# Patient Record
Sex: MALE | Race: WHITE | NOT HISPANIC OR LATINO | Employment: FULL TIME | URBAN - METROPOLITAN AREA
[De-identification: names, ages, dates, MRNs, and addresses within clinical notes are randomized per-mention and may not be internally consistent; named-entity substitution may affect disease eponyms.]

---

## 2017-07-07 ENCOUNTER — OFFICE VISIT (OUTPATIENT)
Dept: NEUROSURGERY | Facility: CLINIC | Age: 57
End: 2017-07-07

## 2017-07-07 VITALS
DIASTOLIC BLOOD PRESSURE: 74 MMHG | HEART RATE: 59 BPM | WEIGHT: 152.8 LBS | BODY MASS INDEX: 23.98 KG/M2 | HEIGHT: 67 IN | SYSTOLIC BLOOD PRESSURE: 116 MMHG

## 2017-07-07 DIAGNOSIS — M48.061 LUMBAR STENOSIS: Primary | ICD-10-CM

## 2017-07-07 DIAGNOSIS — M51.37 DEGENERATION OF LUMBAR OR LUMBOSACRAL INTERVERTEBRAL DISC: ICD-10-CM

## 2017-07-07 PROCEDURE — 99243 OFF/OP CNSLTJ NEW/EST LOW 30: CPT | Performed by: PHYSICIAN ASSISTANT

## 2017-07-07 NOTE — PROGRESS NOTES
Subjective   Patient ID: Demetrio Cotton is a 56 y.o. male is being seen for consultation today at the request of Kassidy Day MD  for back and left leg pain.  He denies any recent cause or injury.  Mr. Cotton has not had any treatments.  He is not taking any pain medications at this time.     Back Pain   This is a new problem. The current episode started more than 1 month ago (3-4 months ). The problem occurs intermittently. The problem has been gradually worsening since onset. The pain is present in the lumbar spine. The quality of the pain is described as aching and shooting. The pain radiates to the right thigh, left knee and left foot (left lower leg ). The pain is at a severity of 6/10. The pain is moderate. The symptoms are aggravated by position. Associated symptoms include leg pain. Pertinent negatives include no bladder incontinence, bowel incontinence, fever, numbness, pelvic pain, perianal numbness, tingling or weakness. (Tightness in  Left foot ) He has tried nothing for the symptoms.   Leg Pain    There was no injury mechanism. The pain is present in the left thigh, left hip, left leg and left foot. The quality of the pain is described as aching. The pain is at a severity of 5/10. The pain is moderate. Pertinent negatives include no numbness or tingling. Associated symptoms comments: Tightness in left foot . He has tried nothing for the symptoms.       The following portions of the patient's history were reviewed and updated as appropriate: allergies, current medications, past family history, past medical history, past social history, past surgical history and problem list.    Review of Systems   Constitutional: Negative for fever.   Gastrointestinal: Negative for bowel incontinence.   Genitourinary: Negative for bladder incontinence, difficulty urinating and pelvic pain.   Musculoskeletal: Positive for back pain (left leg pain ). Negative for gait problem.   Neurological: Negative for tingling,  weakness and numbness.   All other systems reviewed and are negative.      Objective   Physical Exam   Constitutional: He is oriented to person, place, and time. He appears well-developed and well-nourished.   HENT:   Head: Normocephalic and atraumatic.   Right Ear: External ear normal.   Left Ear: External ear normal.   Eyes: Conjunctivae and EOM are normal. Pupils are equal, round, and reactive to light. Right eye exhibits no discharge. Left eye exhibits no discharge.   Neck: Normal range of motion. Neck supple. No tracheal deviation present.   Cardiovascular: Normal rate and regular rhythm.    Pulmonary/Chest: Effort normal and breath sounds normal. No stridor. No respiratory distress.   Abdominal: Soft. Bowel sounds are normal.   Musculoskeletal: Normal range of motion. He exhibits no edema, tenderness or deformity.   Neurological: He is alert and oriented to person, place, and time. He has normal strength and normal reflexes. He displays no atrophy, no tremor and normal reflexes. No cranial nerve deficit or sensory deficit. He exhibits normal muscle tone. He displays a negative Romberg sign. He displays no seizure activity. Coordination and gait normal.   No long tract signs   Skin: Skin is warm and dry.   Psychiatric: He has a normal mood and affect. His behavior is normal. Judgment and thought content normal.   Nursing note and vitals reviewed.    Neurologic Exam     Mental Status   Oriented to person, place, and time.     Cranial Nerves     CN III, IV, VI   Pupils are equal, round, and reactive to light.  Extraocular motions are normal.     Motor Exam     Strength   Strength 5/5 throughout.       Assessment/Plan   Independent Review of Radiographic Studies:    I did review the lumbar spine MRI done without contrast on May 4, 2017 at [facility].  It shows multilevel disc bulging with mild degeneration with moderate stenosis at L3-L4 and more severe stenosis at L4-L5 secondary to a combination of facet  arthropathy disc bulging and ligamentum flavum thickening.  Medical Decision Making:    Mr. Hartmannvers for to us by Dr. Day for back and leg pain that began about 4-5 months ago.  The back pain is described as very mild and more of a soreness with prolonged activity and has been present for longer periods intermittently.  It is not limiting.  However the left anterolateral thigh pain has been more recent and more constant.  It is also fairly mild and more annoying than limiting.  It is described as burning.  It worsens with activity and improves minimally with rest.  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  He has not had any formal treatment at this point.  He denies weakness or incontinence.    Review the MRI with him.  Certainly at this point his pain is mild and he does not have any deficits on exam.  I do not think any surgery is indicated.  If his pain were to worsen epidural injections could be considered but at this point I think therapy is the most reasonable option to try initially.  He will call if the therapy does not help or his symptoms worsen.  Again epidural injections could be considered as a backup option and only if the radicular pain got much worse or he began to experience weakness we consider anything surgical.  Demetrio was seen today for back pain and leg pain.    Diagnoses and all orders for this visit:    Lumbar stenosis  -     Ambulatory Referral to Physical Therapy Evaluate and treat (2-3 times per week for 4wks, core strength, modalities, ROM)    Degeneration of lumbar or lumbosacral intervertebral disc  -     Ambulatory Referral to Physical Therapy Evaluate and treat (2-3 times per week for 4wks, core strength, modalities, ROM)      Return if symptoms worsen or fail to improve.

## 2020-12-17 VITALS
SYSTOLIC BLOOD PRESSURE: 112 MMHG | DIASTOLIC BLOOD PRESSURE: 55 MMHG | DIASTOLIC BLOOD PRESSURE: 81 MMHG | DIASTOLIC BLOOD PRESSURE: 84 MMHG | DIASTOLIC BLOOD PRESSURE: 59 MMHG | TEMPERATURE: 96.6 F | RESPIRATION RATE: 12 BRPM | WEIGHT: 143 LBS | HEART RATE: 68 BPM | HEIGHT: 67 IN | SYSTOLIC BLOOD PRESSURE: 96 MMHG | SYSTOLIC BLOOD PRESSURE: 100 MMHG | OXYGEN SATURATION: 96 % | SYSTOLIC BLOOD PRESSURE: 130 MMHG | DIASTOLIC BLOOD PRESSURE: 69 MMHG | OXYGEN SATURATION: 97 % | HEART RATE: 65 BPM | RESPIRATION RATE: 14 BRPM | SYSTOLIC BLOOD PRESSURE: 98 MMHG | HEART RATE: 61 BPM | SYSTOLIC BLOOD PRESSURE: 103 MMHG | SYSTOLIC BLOOD PRESSURE: 109 MMHG | HEART RATE: 62 BPM | HEART RATE: 66 BPM | RESPIRATION RATE: 9 BRPM | RESPIRATION RATE: 13 BRPM | DIASTOLIC BLOOD PRESSURE: 61 MMHG | OXYGEN SATURATION: 99 % | TEMPERATURE: 97.8 F | OXYGEN SATURATION: 98 % | SYSTOLIC BLOOD PRESSURE: 135 MMHG | HEART RATE: 57 BPM | RESPIRATION RATE: 10 BRPM | RESPIRATION RATE: 20 BRPM

## 2020-12-18 ENCOUNTER — AMBULATORY SURGICAL CENTER (AMBULATORY)
Dept: URBAN - METROPOLITAN AREA SURGERY 17 | Facility: SURGERY | Age: 60
End: 2020-12-18

## 2020-12-18 DIAGNOSIS — Z80.0 FAMILY HISTORY OF MALIGNANT NEOPLASM OF DIGESTIVE ORGANS: ICD-10-CM

## 2020-12-18 DIAGNOSIS — Z86.010 PERSONAL HISTORY OF COLONIC POLYPS: ICD-10-CM

## 2020-12-18 PROCEDURE — 45378 DIAGNOSTIC COLONOSCOPY: CPT | Mod: 33 | Performed by: INTERNAL MEDICINE

## 2021-03-30 ENCOUNTER — BULK ORDERING (OUTPATIENT)
Dept: CASE MANAGEMENT | Facility: OTHER | Age: 61
End: 2021-03-30

## 2021-03-30 DIAGNOSIS — Z23 IMMUNIZATION DUE: ICD-10-CM

## 2022-08-18 ENCOUNTER — OFFICE (AMBULATORY)
Dept: URBAN - METROPOLITAN AREA CLINIC 75 | Facility: CLINIC | Age: 62
End: 2022-08-18

## 2022-08-18 VITALS
HEART RATE: 53 BPM | DIASTOLIC BLOOD PRESSURE: 78 MMHG | OXYGEN SATURATION: 96 % | WEIGHT: 157.6 LBS | HEIGHT: 67 IN | SYSTOLIC BLOOD PRESSURE: 120 MMHG

## 2022-08-18 DIAGNOSIS — Z86.010 PERSONAL HISTORY OF COLONIC POLYPS: ICD-10-CM

## 2022-08-18 DIAGNOSIS — R05.3 CHRONIC COUGH: ICD-10-CM

## 2022-08-18 DIAGNOSIS — R10.32 LEFT LOWER QUADRANT PAIN: ICD-10-CM

## 2022-08-18 DIAGNOSIS — R13.10 DYSPHAGIA, UNSPECIFIED: ICD-10-CM

## 2022-08-18 DIAGNOSIS — Z80.0 FAMILY HISTORY OF MALIGNANT NEOPLASM OF DIGESTIVE ORGANS: ICD-10-CM

## 2022-08-18 DIAGNOSIS — R10.12 LEFT UPPER QUADRANT PAIN: ICD-10-CM

## 2022-08-18 PROCEDURE — 99214 OFFICE O/P EST MOD 30 MIN: CPT | Performed by: NURSE PRACTITIONER

## 2022-09-27 VITALS
DIASTOLIC BLOOD PRESSURE: 87 MMHG | SYSTOLIC BLOOD PRESSURE: 127 MMHG | OXYGEN SATURATION: 96 % | DIASTOLIC BLOOD PRESSURE: 107 MMHG | RESPIRATION RATE: 17 BRPM | RESPIRATION RATE: 12 BRPM | HEIGHT: 67 IN | RESPIRATION RATE: 16 BRPM | DIASTOLIC BLOOD PRESSURE: 74 MMHG | SYSTOLIC BLOOD PRESSURE: 118 MMHG | DIASTOLIC BLOOD PRESSURE: 90 MMHG | OXYGEN SATURATION: 98 % | RESPIRATION RATE: 14 BRPM | TEMPERATURE: 96.6 F | HEART RATE: 60 BPM | TEMPERATURE: 97.7 F | HEART RATE: 53 BPM | HEART RATE: 63 BPM | SYSTOLIC BLOOD PRESSURE: 135 MMHG | HEART RATE: 59 BPM | WEIGHT: 155 LBS | HEART RATE: 61 BPM | DIASTOLIC BLOOD PRESSURE: 86 MMHG | SYSTOLIC BLOOD PRESSURE: 124 MMHG | DIASTOLIC BLOOD PRESSURE: 82 MMHG | SYSTOLIC BLOOD PRESSURE: 134 MMHG | HEART RATE: 58 BPM | SYSTOLIC BLOOD PRESSURE: 122 MMHG | OXYGEN SATURATION: 99 %

## 2022-09-28 ENCOUNTER — OFFICE (AMBULATORY)
Dept: URBAN - METROPOLITAN AREA PATHOLOGY 4 | Facility: PATHOLOGY | Age: 62
End: 2022-09-28
Payer: COMMERCIAL

## 2022-09-28 ENCOUNTER — AMBULATORY SURGICAL CENTER (AMBULATORY)
Dept: URBAN - METROPOLITAN AREA SURGERY 17 | Facility: SURGERY | Age: 62
End: 2022-09-28

## 2022-09-28 DIAGNOSIS — R10.9 UNSPECIFIED ABDOMINAL PAIN: ICD-10-CM

## 2022-09-28 DIAGNOSIS — R05.3 CHRONIC COUGH: ICD-10-CM

## 2022-09-28 DIAGNOSIS — K21.00 GASTRO-ESOPHAGEAL REFLUX DISEASE WITH ESOPHAGITIS, WITHOUT B: ICD-10-CM

## 2022-09-28 DIAGNOSIS — R13.10 DYSPHAGIA, UNSPECIFIED: ICD-10-CM

## 2022-09-28 LAB
GI HISTOLOGY: A. UNSPECIFIED: (no result)
GI HISTOLOGY: B. SELECT: (no result)
GI HISTOLOGY: C. UNSPECIFIED: (no result)
GI HISTOLOGY: D. UNSPECIFIED: (no result)
GI HISTOLOGY: PDF REPORT: (no result)

## 2022-09-28 PROCEDURE — 88305 TISSUE EXAM BY PATHOLOGIST: CPT | Performed by: INTERNAL MEDICINE

## 2022-09-28 PROCEDURE — 43249 ESOPH EGD DILATION <30 MM: CPT | Performed by: INTERNAL MEDICINE

## 2022-09-28 PROCEDURE — 88312 SPECIAL STAINS GROUP 1: CPT | Performed by: INTERNAL MEDICINE

## 2022-09-28 PROCEDURE — 43239 EGD BIOPSY SINGLE/MULTIPLE: CPT | Mod: 59 | Performed by: INTERNAL MEDICINE
